# Patient Record
Sex: FEMALE | Race: WHITE | NOT HISPANIC OR LATINO | URBAN - METROPOLITAN AREA
[De-identification: names, ages, dates, MRNs, and addresses within clinical notes are randomized per-mention and may not be internally consistent; named-entity substitution may affect disease eponyms.]

---

## 2017-05-12 ENCOUNTER — HOME CARE/HOSPICE - HEALTHEAST (OUTPATIENT)
Dept: HOSPICE | Facility: HOSPICE | Age: 82
End: 2017-05-12

## 2017-05-13 ENCOUNTER — HOME CARE/HOSPICE - HEALTHEAST (OUTPATIENT)
Dept: HOSPICE | Facility: HOSPICE | Age: 82
End: 2017-05-13

## 2017-05-15 ENCOUNTER — OFFICE VISIT - HEALTHEAST (OUTPATIENT)
Dept: GERIATRICS | Facility: CLINIC | Age: 82
End: 2017-05-15

## 2017-05-15 DIAGNOSIS — J18.9 PNEUMONIA: ICD-10-CM

## 2017-05-15 DIAGNOSIS — D64.9 ANEMIA: ICD-10-CM

## 2017-05-15 DIAGNOSIS — N18.4 CKD (CHRONIC KIDNEY DISEASE) STAGE 4, GFR 15-29 ML/MIN (H): ICD-10-CM

## 2017-05-15 DIAGNOSIS — K21.9 GERD (GASTROESOPHAGEAL REFLUX DISEASE): ICD-10-CM

## 2017-05-15 DIAGNOSIS — I34.0 MITRAL REGURGITATION: ICD-10-CM

## 2017-05-15 DIAGNOSIS — I10 HYPERTENSION: ICD-10-CM

## 2017-05-15 DIAGNOSIS — I35.0 AORTIC STENOSIS: ICD-10-CM

## 2017-05-15 DIAGNOSIS — I50.9 CONGESTIVE HEART FAILURE (H): ICD-10-CM

## 2017-05-22 ENCOUNTER — OFFICE VISIT - HEALTHEAST (OUTPATIENT)
Dept: GERIATRICS | Facility: CLINIC | Age: 82
End: 2017-05-22

## 2017-05-22 DIAGNOSIS — D64.9 ANEMIA: ICD-10-CM

## 2017-05-22 DIAGNOSIS — I50.9 CONGESTIVE HEART FAILURE (H): ICD-10-CM

## 2017-05-22 DIAGNOSIS — K21.9 GERD (GASTROESOPHAGEAL REFLUX DISEASE): ICD-10-CM

## 2017-05-22 DIAGNOSIS — J18.9 PNEUMONIA: ICD-10-CM

## 2017-05-22 DIAGNOSIS — N18.4 CKD (CHRONIC KIDNEY DISEASE) STAGE 4, GFR 15-29 ML/MIN (H): ICD-10-CM

## 2017-05-22 DIAGNOSIS — I35.0 AORTIC STENOSIS: ICD-10-CM

## 2017-05-22 DIAGNOSIS — I10 ESSENTIAL HYPERTENSION: ICD-10-CM

## 2017-05-22 DIAGNOSIS — I34.0 MITRAL REGURGITATION: ICD-10-CM

## 2017-05-25 ENCOUNTER — HOME CARE/HOSPICE - HEALTHEAST (OUTPATIENT)
Dept: HOSPICE | Facility: HOSPICE | Age: 82
End: 2017-05-25

## 2017-05-25 ENCOUNTER — AMBULATORY - HEALTHEAST (OUTPATIENT)
Dept: GERIATRICS | Facility: CLINIC | Age: 82
End: 2017-05-25

## 2017-06-06 ENCOUNTER — HOME CARE/HOSPICE - HEALTHEAST (OUTPATIENT)
Dept: HOSPICE | Facility: HOSPICE | Age: 82
End: 2017-06-06

## 2017-07-03 ENCOUNTER — HOME CARE/HOSPICE - HEALTHEAST (OUTPATIENT)
Dept: HOSPICE | Facility: HOSPICE | Age: 82
End: 2017-07-03

## 2017-07-05 ENCOUNTER — HOME CARE/HOSPICE - HEALTHEAST (OUTPATIENT)
Dept: HOSPICE | Facility: HOSPICE | Age: 82
End: 2017-07-05

## 2017-07-10 ENCOUNTER — HOME CARE/HOSPICE - HEALTHEAST (OUTPATIENT)
Dept: HOSPICE | Facility: HOSPICE | Age: 82
End: 2017-07-10

## 2017-07-14 ENCOUNTER — HOME CARE/HOSPICE - HEALTHEAST (OUTPATIENT)
Dept: HOSPICE | Facility: HOSPICE | Age: 82
End: 2017-07-14

## 2017-08-26 ENCOUNTER — HOME CARE/HOSPICE - HEALTHEAST (OUTPATIENT)
Dept: HOSPICE | Facility: HOSPICE | Age: 82
End: 2017-08-26

## 2021-05-25 ENCOUNTER — RECORDS - HEALTHEAST (OUTPATIENT)
Dept: ADMINISTRATIVE | Facility: CLINIC | Age: 86
End: 2021-05-25

## 2021-05-27 ENCOUNTER — RECORDS - HEALTHEAST (OUTPATIENT)
Dept: ADMINISTRATIVE | Facility: CLINIC | Age: 86
End: 2021-05-27

## 2021-05-28 ENCOUNTER — RECORDS - HEALTHEAST (OUTPATIENT)
Dept: ADMINISTRATIVE | Facility: CLINIC | Age: 86
End: 2021-05-28

## 2021-05-29 ENCOUNTER — RECORDS - HEALTHEAST (OUTPATIENT)
Dept: ADMINISTRATIVE | Facility: CLINIC | Age: 86
End: 2021-05-29

## 2021-05-30 ENCOUNTER — RECORDS - HEALTHEAST (OUTPATIENT)
Dept: ADMINISTRATIVE | Facility: CLINIC | Age: 86
End: 2021-05-30

## 2021-05-31 ENCOUNTER — RECORDS - HEALTHEAST (OUTPATIENT)
Dept: ADMINISTRATIVE | Facility: CLINIC | Age: 86
End: 2021-05-31

## 2021-06-01 ENCOUNTER — RECORDS - HEALTHEAST (OUTPATIENT)
Dept: ADMINISTRATIVE | Facility: CLINIC | Age: 86
End: 2021-06-01

## 2021-06-10 NOTE — PROGRESS NOTES
[facility]      Facility:    ANSWER ROOMING ACTIVITY QUESTION  Code Status: DNR      Chief Complaint/Reason for Visit:  No chief complaint on file.      HPI:   Bryanna is a 89 y.o. female who has a known history of severe aortic stenosis and moderately severe mitral regurgitation was experiencing worsening shortness of breath over a period of a week prior to admission to the hospital on 11 May.  She was seen by her family physician who noted marked deterioration of her renal function, increasing shortness of breath, and left upper lobe infiltrate on chest x-ray.  Following initiation of antibiotics intravenously she was stabilized.  Because of what was considered severe global weakness she was transferred to the transitional care unit before considering final disposition.  While in the hospital she had a hospice consult but family encouraged her to transfer to the TCU to see if she could improve her clinical status with reconditioning    Past Medical History:  Past Medical History:   Diagnosis Date     Abdominal lipoma 4/17/2014     Acid reflux 4/17/2014     Age-related macular degeneration, dry 2/19/2015     Anxiety state 1/27/2012     Aortic heart valve narrowing 02/19/2015     Carotid artery narrowing 02/19/2015     CC (collagenous colitis) 8/27/2012     Chronic glaucoma 12/13/2012     HTN (hypertension)      Osteoporosis      Pseudoaphakia 01/09/2014           Surgical History:  No past surgical history on file.    Family History:   No family history on file.    Social History:    Social History     Social History     Marital status:      Spouse name: N/A     Number of children: N/A     Years of education: N/A     Social History Main Topics     Smoking status: Former Smoker     Years: 10.00     Types: Cigarettes     Quit date: 1/1/1965     Smokeless tobacco: Never Used     Alcohol use No     Drug use: No     Sexual activity: Not on file     Other Topics Concern     Not on file      Social History Narrative          Review of Systems   Constitutional: Positive for activity change and fatigue. Negative for fever and unexpected weight change.   HENT: Negative for congestion, drooling, hearing loss, rhinorrhea, sore throat and trouble swallowing.    Eyes: Negative for discharge, redness and visual disturbance.   Respiratory: Positive for cough. Negative for shortness of breath and wheezing.         Left upper lobe pneumonia   Cardiovascular: Negative for chest pain, palpitations and leg swelling.        History CHF, hypertension and recent exacerbation of her chronic heart failure   Gastrointestinal: Negative for abdominal distention, constipation, diarrhea, nausea and vomiting.        Chronic GERD   Genitourinary: Negative for difficulty urinating, dysuria, enuresis, frequency and urgency.        CKD 3   Musculoskeletal: Negative for back pain, gait problem and joint swelling.   Skin: Negative for color change, pallor and rash.   Allergic/Immunologic: Negative for immunocompromised state.   Neurological: Negative for tremors, weakness, light-headedness and headaches.   Hematological: Does not bruise/bleed easily.        Anemia   Psychiatric/Behavioral: Negative for agitation, behavioral problems, confusion, dysphoric mood and sleep disturbance. The patient is nervous/anxious.        Vital signs reviewed since admission.  Blood pressures are low.  Pulses intermittently elevated.    Physical Exam   Constitutional: She is oriented to person, place, and time. She is cooperative. She is easily aroused. No distress.   Appears under nourished and frail.  Appears younger than her stated age   HENT:   Head: Normocephalic and atraumatic.   Right Ear: External ear normal.   Left Ear: External ear normal.   Nose: Nose normal.   Eyes: Conjunctivae and EOM are normal. Pupils are equal, round, and reactive to light. Right eye exhibits no discharge. Left eye exhibits no discharge. No scleral icterus.   Neck:  Phonation normal. No JVD present. No tracheal deviation present. No thyroid mass and no thyromegaly present.   Cardiovascular: Normal rate, regular rhythm, normal heart sounds and intact distal pulses.  Exam reveals no gallop and no friction rub.    No murmur heard.  Pulmonary/Chest: Effort normal and breath sounds normal. No stridor. No respiratory distress. She has no wheezes. She has no rales.   Abdominal: Soft. Bowel sounds are normal. She exhibits no distension. There is no tenderness. There is no rebound and no guarding.   Musculoskeletal: She exhibits deformity (Arthritic deformities of both hands). She exhibits no edema.   Lymphadenopathy:     She has no cervical adenopathy.   Neurological: She is alert, oriented to person, place, and time and easily aroused. Coordination normal.   Skin: Skin is warm and dry. No rash noted. She is not diaphoretic. No erythema. No pallor.   Psychiatric: She has a normal mood and affect. Her behavior is normal. Thought content normal.   Vitals reviewed.      Medication List:  Current Outpatient Prescriptions   Medication Sig     acetaminophen (TYLENOL) 500 MG tablet Take 500 mg by mouth every 4 (four) hours as needed for pain.      amoxicillin-clavulanate (AUGMENTIN) 875-125 mg per tablet Take 1 tablet by mouth 2 (two) times a day for 4 days.     amoxicillin-clavulanate (AUGMENTIN) 875-125 mg per tablet Take 1 tablet by mouth 2 (two) times a day with meals for 7 days.     aspirin 81 MG EC tablet Take 81 mg by mouth daily.      dorzolamide-timolol (COSOPT) 22.3-6.8 mg/mL ophthalmic solution Place 1 Drop into both eyes 2 times daily.     furosemide (LASIX) 40 MG tablet Take 40 mg by mouth daily.      hydrOXYzine (ATARAX) 25 MG tablet Take 25 mg by mouth every 8 (eight) hours as needed for anxiety.      melatonin 5 mg Tab tablet Take 5 mg by mouth at bedtime as needed.      omeprazole (PRILOSEC) 20 MG capsule Take 20 mg by mouth Daily before breakfast.      sertraline (ZOLOFT)  100 MG tablet Take 100 mg by mouth daily.      simvastatin (ZOCOR) 20 MG tablet Take 20 mg by mouth at bedtime.      travoprost (TRAVATAN Z) 0.004 % ophthalmic drops Administer 1 drop to both eyes at bedtime.      vit C-vit E-lutein-min-om-3 (OCUVITE) 195-45-4-150 mg-unit-mg-mg cap Take 1 tablet by mouth 2 (two) times a day.        Labs:  Lab Results   Component Value Date    HGB 9.6 (L) 05/12/2017    HGB 10.5 (L) 05/11/2017     Results for orders placed or performed during the hospital encounter of 05/11/17   Basic Metabolic Panel   Result Value Ref Range    Sodium 141 136 - 145 mmol/L    Potassium 3.2 (L) 3.5 - 5.0 mmol/L    Chloride 100 98 - 107 mmol/L    CO2 31 22 - 31 mmol/L    Anion Gap, Calculation 10 5 - 18 mmol/L    Glucose 91 70 - 125 mg/dL    Calcium 8.5 8.5 - 10.5 mg/dL    BUN 30 (H) 8 - 28 mg/dL    Creatinine 0.95 0.60 - 1.10 mg/dL    GFR MDRD Af Amer >60 >60 mL/min/1.73m2    GFR MDRD Non Af Amer 54 (L) >60 mL/min/1.73m2     Lab Results   Component Value Date    CREATININE 0.95 05/12/2017    CREATININE 1.05 05/11/2017     Lab Results   Component Value Date     (H) 05/12/2017       Assessment:    ICD-10-CM    1. Congestive heart failure I50.9  hospitalized on May 11 with severe heart failure.  Currently on sodium restriction and diuretics.  Ejection fraction was 53%.  Evidence of severe aortic stenosis and modest mitral stenosis.  Patient is declined further intervention.  Family was approached regarding hospice but wanted to give her an opportunity to see if she could improve her endurance and strength.     2. Pneumonia J18.9  left upper lobe.  Diminished breath sounds with no rales in this area.  The remainder of the lung fields are clear.  Has had IV antibiotics and now is finishing her treatment with Augmentin for another 5 days.     3. GERD (gastroesophageal reflux disease) K21.9  past history of reflux.  She stopped taking her omeprazole at one time in the past and had increased symptoms.      4. Hypertension I10  blood pressures low normal.     5. CKD (chronic kidney disease) stage 4, GFR 15-29 ml/min N18.4  creatinine/GFR = 1.05/48 on admission to the hospital.  These parameters have improved with diuresis    6. Anemia D64.9  10.5 on admission to the hospital.  She has an elevated MCV of 100.     7. Aortic stenosis I35.0  this is diagnosed by echo as severe.  No intervention desired    8. Mitral regurgitation I34.0  this is diagnosed as moderate by echo.     9.     Diarrhea         in the past 1-one half days she has noted increased frequency of soft to loose stools.  She has been on IV antibiotics and oral Augmentin and is also been taking omeprazole.  Will collect stool for C. difficile toxin and begin metronidazole if positive  10.      Mood disorder.  Discussed with her daughter.  She has used Vistaril for anxiety and for sleep after trying a wide array of anxiolytics and sleep medications.  She has a tendency towards anxiety and has been taking sertraline 100 mg every morning.  If sleep becomes a significant problem then consider dosing the sertraline at night      Plan:  PT/OT for strengthening and improved stamina.  Close monitoring for heart failure.  Monitoring for clearance of her pneumonia.  Laboratory monitoring of anemia and renal function.  Assess loose stools for the possibility of C. difficile      Electronically signed by: Yazan Mike MD

## 2021-06-10 NOTE — PROGRESS NOTES
[facility]    Facility:   [facility] [[id number]]   Code Status: DNR  PCP: Idania Zamarripa MD   Phone: [phone]   Fax: [phone]      CHIEF COMPLAINT/REASON FOR VISIT:  Chief Complaint   Patient presents with     Discharge Summary       HISTORY COURSE:  Bryanna is a 89 y.o. female who has a known history of severe aortic stenosis and moderately severe mitral regurgitation was experiencing worsening shortness of breath over a period of a week prior to admission to the hospital on 11 May.  She was seen by her family physician who noted marked deterioration of her renal function, increasing shortness of breath, and left upper lobe infiltrate on chest x-ray.  Following initiation of antibiotics intravenously she was stabilized.  Because of what was considered severe global weakness she was transferred to the transitional care unit before considering final disposition.  While in the hospital she had a hospice consult but family encouraged her to transfer to the TCU to see if she could improve her clinical status with reconditioning.  Family has planned to move her to assisted living where she can get more supervision.  Uncertain when a bed will be available but anticipate within the next several days.  Patient is being seen in preparation for discharge.    Review of Systems   Constitutional: Positive for fatigue. Negative for fever and unexpected weight change.   HENT: Negative for congestion, drooling, hearing loss, rhinorrhea, sore throat and trouble swallowing.    Eyes: Positive for visual disturbance. Negative for discharge and redness.   Respiratory: Negative for cough, shortness of breath and wheezing.         Recent left lower lobe pneumonia   Cardiovascular: Negative for chest pain, palpitations and leg swelling.        Severe near critical aortic stenosis  Moderate mitral regurgitation  Chronic heart failure   Gastrointestinal: Positive for diarrhea (Recent  diarrhea negative for C. difficile) and vomiting (Began vomiting last night and into this morning.  When seen near noontime she was eating waffles with strawberry syrup and appeared to be enjoying that). Negative for abdominal distention, constipation and nausea.        History chronic GERD   Genitourinary: Negative for difficulty urinating, dysuria, enuresis, frequency and urgency.        CKD 3 improving   Musculoskeletal: Negative for back pain, gait problem and joint swelling.   Skin: Negative for color change, pallor and rash.   Allergic/Immunologic: Negative for immunocompromised state.   Neurological: Negative for tremors, weakness, light-headedness and headaches.   Hematological: Does not bruise/bleed easily.        Anemia   Psychiatric/Behavioral: Negative for agitation, behavioral problems, confusion, dysphoric mood and sleep disturbance.        vital signs reviewed over the past 3 days.  Low to low normal blood pressures.  Remainder normal and stable.  6 pound weight loss since admission to the TCU    Physical Exam   Constitutional: She is oriented to person, place, and time. She is cooperative. She is easily aroused. No distress.   Appears frail and under nourished   HENT:   Head: Normocephalic and atraumatic.   Right Ear: External ear normal.   Left Ear: External ear normal.   Nose: Nose normal.   Eyes: EOM are normal. Pupils are equal, round, and reactive to light. Right eye exhibits no discharge. Left eye exhibits no discharge. No scleral icterus.   Pale conjunctivae   Neck: Phonation normal. No JVD present. No tracheal deviation present. No thyroid mass and no thyromegaly present.   Cardiovascular: Normal rate, regular rhythm and intact distal pulses.  Exam reveals no gallop and no friction rub.    Murmur (Grade 3 holosystolic murmur.  Questionable very faint mid diastolic murmur not definite) heard.  Pulmonary/Chest: Effort normal and breath sounds normal. No stridor. No respiratory distress. She has  no wheezes. She has no rales.   Abdominal: Soft. Bowel sounds are normal. She exhibits no distension. There is no tenderness. There is no rebound and no guarding.   Musculoskeletal: She exhibits no edema.   Lymphadenopathy:     She has no cervical adenopathy.   Neurological: She is alert, oriented to person, place, and time and easily aroused. Coordination normal.   Skin: Skin is warm and dry. No rash noted. She is not diaphoretic. No erythema. No pallor.   Psychiatric: She has a normal mood and affect. Her behavior is normal. Thought content normal.   Vitals reviewed.      MEDICATION LIST:  Current Outpatient Prescriptions   Medication Sig     acetaminophen (TYLENOL) 500 MG tablet Take 500 mg by mouth every 4 (four) hours as needed for pain.      aspirin 81 MG EC tablet Take 81 mg by mouth daily.      dorzolamide-timolol (COSOPT) 22.3-6.8 mg/mL ophthalmic solution Place 1 Drop into both eyes 2 times daily.     furosemide (LASIX) 40 MG tablet Take 40 mg by mouth daily.      hydrOXYzine (ATARAX) 25 MG tablet Take 25 mg by mouth every 8 (eight) hours as needed for anxiety.      melatonin 5 mg Tab tablet Take 5 mg by mouth at bedtime as needed.      omeprazole (PRILOSEC) 20 MG capsule Take 20 mg by mouth Daily before breakfast.      sertraline (ZOLOFT) 100 MG tablet Take 100 mg by mouth daily.      simvastatin (ZOCOR) 20 MG tablet Take 20 mg by mouth at bedtime.      travoprost (TRAVATAN Z) 0.004 % ophthalmic drops Administer 1 drop to both eyes at bedtime.      vit C-vit E-lutein-min-om-3 (OCUVITE) 184-41-5-150 mg-unit-mg-mg cap Take 1 tablet by mouth 2 (two) times a day.        DISCHARGE DIAGNOSIS:    ICD-10-CM    1. Congestive heart failure I50.9  chronic and slowly progressive.  Represents her major morbidity.  She and family have chosen not to have any intervention.  Hospice has been offered but family wants to see if they can get her reconditioned enough to be comfortable in assisted living with greater  supervision    2. Pneumonia J18.9  lung sounds are clear.  No rales rhonchi or wheezing.  Symmetric respiratory excursion.  Symmetric percussion tone.     3. GERD (gastroesophageal reflux disease) K21.9  episode of vomiting last night.  She believes that she drank her insurer to rapidly.  No vomiting or GI distress prior to this.  Now eating brunch without apparent problems.  Afebrile.  No cough.     4. Essential hypertension I10  blood pressures low to low normal    5. CKD (chronic kidney disease) stage 4, GFR 15-29 ml/min N18.4  GFR 48 in the hospital.  One week prior to discharge creatinine/GFR 0.76/60 normal electrolytes    6. Anemia D64.9  hemoglobin 9.6 at the time of hospital discharge.  10.0 on May 18.  Will need reevaluation in the next 2-3 weeks by her family physician.     7. Aortic stenosis I35.0  severe tight aortic stenosis.  Patient declines further intervention    8. Mitral regurgitation I34.0  moderate regurgitation.  Patient declines any other intervention.         MEDICAL EQUIPMENT NEEDS:  Walker    DISCHARGE PLAN/FACE TO FACE:  I certify that services are/were furnished while this patient was under the care of a physician and that a physician or an allowed non-physician practitioner (NPP), had a face-to-face encounter that meets the physician face-to-face encounter requirements. The encounter was in whole, or in part, related to the primary reason for home health. The patient is confined to his/her home and needs intermittent skilled nursing, physical therapy, speech-language pathology, or the continued need for occupational therapy. A plan of care has been established by a physician and is periodically reviewed by a physician.  Date of Face-to-Face Encounter: May 22, 2017    I certify that, based on my findings, the following services are medically necessary home health services: Physical therapy.  Occupational therapy.  Home health aide.      My clinical findings support the need for the above  skilled services because: (Please write a brief narrative summary that describes what the RN, PT, SLP, or other services will be doing in the home. A list of diagnoses in this section does not meet the CMS requirements.)  Patient will be in assisted living with minimal services.  Needs PT/OT to ensure balance, optimal functioning, and endurance.  Needs home health aide for safety with bathing.      This patient is homebound because: (Please write a brief narrative summary describing the functional limitations as to why this patient is homebound and specifically what makes this patient homebound.)  Patient does not drive.  Mild dementia.  Needs a walker for movement.  Absences from home would require considerable and taxing effort.    The patient is, or has been, under my care and I have initiated the establishment of the plan of care. This patient will be followed by a physician who will periodically review the plan of care.  Patient should follow-up with her family physician to monitor the progress of her heart failure, use of furosemide and trend of hemoglobin    Electronically signed by: Yaazn Mike MD

## 2021-06-15 PROBLEM — R79.89 ELEVATED SERUM CREATININE: Status: ACTIVE | Noted: 2017-05-11

## 2021-06-15 PROBLEM — I50.9 CHF (CONGESTIVE HEART FAILURE) (H): Status: ACTIVE | Noted: 2017-05-11

## 2021-06-15 PROBLEM — D64.9 ANEMIA: Status: ACTIVE | Noted: 2017-05-11

## 2021-06-15 PROBLEM — J18.9 PNEUMONIA OF LEFT UPPER LOBE DUE TO INFECTIOUS ORGANISM: Status: ACTIVE | Noted: 2017-05-11

## 2021-07-13 ENCOUNTER — RECORDS - HEALTHEAST (OUTPATIENT)
Dept: ADMINISTRATIVE | Facility: CLINIC | Age: 86
End: 2021-07-13